# Patient Record
Sex: MALE | Race: WHITE | NOT HISPANIC OR LATINO | URBAN - NONMETROPOLITAN AREA
[De-identification: names, ages, dates, MRNs, and addresses within clinical notes are randomized per-mention and may not be internally consistent; named-entity substitution may affect disease eponyms.]

---

## 2019-01-08 ENCOUNTER — IMPORTED ENCOUNTER (OUTPATIENT)
Dept: URBAN - NONMETROPOLITAN AREA CLINIC 1 | Facility: CLINIC | Age: 69
End: 2019-01-08

## 2019-01-08 PROBLEM — H52.4: Noted: 2019-01-08

## 2019-01-08 PROBLEM — H52.223: Noted: 2019-01-08

## 2019-01-08 PROBLEM — H02.834: Noted: 2019-01-08

## 2019-01-08 PROBLEM — H02.831: Noted: 2019-01-08

## 2019-01-08 PROBLEM — E10.9: Noted: 2019-01-08

## 2019-01-08 PROBLEM — H52.03: Noted: 2019-01-08

## 2019-01-08 PROBLEM — H11.31: Noted: 2019-01-08

## 2019-01-08 PROCEDURE — 92012 INTRM OPH EXAM EST PATIENT: CPT

## 2019-01-08 NOTE — PATIENT DISCUSSION
ALEX/Work-In 1/8/19. Discussed [facility] and reassured. RTC for CEE. Type 1 DM per pt - 1999. 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

## 2019-02-27 ENCOUNTER — IMPORTED ENCOUNTER (OUTPATIENT)
Dept: URBAN - NONMETROPOLITAN AREA CLINIC 1 | Facility: CLINIC | Age: 69
End: 2019-02-27

## 2019-02-27 PROCEDURE — S0621 ROUTINE OPHTHALMOLOGICAL EXA: HCPCS

## 2019-02-27 NOTE — PATIENT DISCUSSION
Type 1 DM per pt - 1999. Last A1C was 6.9% in July 2018. Minimal BDR. Stressed continued good control. Letter. 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 PCP Pastora MCDOWELL @ [facility]

## 2019-09-23 NOTE — PATIENT DISCUSSION
New Prescription: prednisol ace-gatiflox-bromfen (prednisol ace-gatiflox-bromfen): drops,suspension: 1-0.5-0.075% 1 drop three times a day into affected eye 09-

## 2019-10-02 NOTE — PATIENT DISCUSSION
***This patient had traditional  cataract surgery performed. A monofocal  IOL was placed to achieve a target refraction of plano (which should provide them with satisfactory distance vision with the aid of glasses/contact lenses). ***

## 2019-10-02 NOTE — PATIENT DISCUSSION
S/P PC IOL, OD - DOING WELL. CONTINUE DROPS AS DIRECTED. FOLLOW. PT HAS FU SCHEDULED TOMORROW W/ DR. W[place].

## 2019-10-10 NOTE — PATIENT DISCUSSION
Stopped Today: prednisol ace-gatiflox-bromfen (prednisol ace-gatiflox-bromfen): drops,suspension: 1-0.5-0.075% 1 drop three times a day into affected eye 09-

## 2019-10-10 NOTE — PATIENT DISCUSSION
New Prescription: prednisol ace-gatiflox-bromfen (prednisol ace-gatiflox-bromfen): drops,suspension: 1-0.5-0.075% 1 drop three times a day into affected eye 10-

## 2019-10-16 NOTE — PATIENT DISCUSSION
Continue: prednisol ace-gatiflox-bromfen (prednisol ace-gatiflox-bromfen): drops,suspension: 1-0.5-0.075% 1 drop three times a day into affected eye 10-

## 2020-02-18 NOTE — PATIENT DISCUSSION
CATARACTS, OU - NOT VISUALLY SIGNIFICANT, PATIENT NOT VISUALLY BOTHERED. DISC OPTION OF [id number]. GLASSES RX GIVEN TO FILL IF DESIRES.

## 2020-02-18 NOTE — PATIENT DISCUSSION
specific patterns of ultraviolet (UV) light produced by the RxSight Light Delivery Device (LDD), an instrument that the doctor uses in the office beginning 2-3 weeks after cataract surgery. Up to three light adjustment treatments can be performed to improve the patients vision, with a separation of 3 days between treatments. When the patient and doctor are satisfied with the vision, the same LDD is used to lockin the RxLAL? and make the prescription permanent. From immediately after surgery until 24 hours after the completion of the lockin treatment, it was discussed that the need to protect the RxLAL from UV light in the environment by wearing protective eyewear during all waking hours.

## 2020-02-18 NOTE — PATIENT DISCUSSION
The RxSight Light Adjustable Lens (RxLAL) is similar to other intraocular lenses (IOLs) that can be implanted in the eye to replace the natural lens that is removed during cataract surgery. While all IOLs improve vision after cataract surgery, most patients will require glasses (or contact lenses) to improve their vision to the level required for driving or reading. The RxLAL reduces the need for glasses or contact lenses by being able to change its focusing power after it is implanted in the eye.  The focusing power of the RxLAL is adjusted by a

## 2020-02-18 NOTE — PATIENT DISCUSSION
The risks, benefits, and alternatives associated with the light adjustable lens have been discussed.

## 2020-02-18 NOTE — PATIENT DISCUSSION
COULD CONSIDER MONOVISION WITH BETSY IOL.  DISCUSSED BENEFIT DUE TO BEING ABLE TO ADJUST HIS VISION AFTER SURGERY

## 2020-10-26 NOTE — PATIENT DISCUSSION
CATARACTS, OU- SURGERY NOT RECOMMENDED AT THIS TIME. DISC OPT OF [id number]. FOLLOW UP WITH REFERRING PHYSICIAN.

## 2020-10-26 NOTE — PATIENT DISCUSSION
Continue: prednisol ace-gatiflox-bromfen (prednisol ace-gatiflox-bromfen): drops,suspension: 1-0.5-0.075% 1 drop three times a day as directed into affected eye 03-

## 2021-07-16 ENCOUNTER — IMPORTED ENCOUNTER (OUTPATIENT)
Dept: URBAN - NONMETROPOLITAN AREA CLINIC 1 | Facility: CLINIC | Age: 71
End: 2021-07-16

## 2021-07-16 PROBLEM — H34.12: Noted: 2021-07-16

## 2021-07-16 PROCEDURE — 92014 COMPRE OPH EXAM EST PT 1/>: CPT

## 2021-07-16 NOTE — PATIENT DISCUSSION
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 PCP Pastora MCDOWELL @ [facility]

## 2021-08-19 ENCOUNTER — IMPORTED ENCOUNTER (OUTPATIENT)
Dept: URBAN - NONMETROPOLITAN AREA CLINIC 1 | Facility: CLINIC | Age: 71
End: 2021-08-19

## 2021-08-19 PROCEDURE — 92014 COMPRE OPH EXAM EST PT 1/>: CPT

## 2021-08-19 NOTE — PATIENT DISCUSSION
[facility] ON FINDINGSMONIIT[facility] FOR NEOPT SAW PCP AND STARTED MEDSMONITOR FOR CHANGES; Dr's Notes:  responsible for taking care of all the area water plants. PCP Pastora MCDOWELL @ [facility]

## 2022-04-09 ASSESSMENT — VISUAL ACUITY
OD_CC: 20/30-1
OS_CC: 20/30
OU_CC: J1+
OS_CC: CF4'
OD_CC: 20/25
OS_CC: 20/40
OS_CC: CF4'
OD_CC: 20/25
OD_CC: 20/30

## 2022-04-09 ASSESSMENT — TONOMETRY
OD_IOP_MMHG: 18
OS_IOP_MMHG: 18
OS_IOP_MMHG: 18
OD_IOP_MMHG: 18
OD_IOP_MMHG: 18
OS_IOP_MMHG: 18

## 2022-08-22 ENCOUNTER — COMPREHENSIVE EXAM (OUTPATIENT)
Dept: RURAL CLINIC 1 | Facility: CLINIC | Age: 72
End: 2022-08-22

## 2022-08-22 DIAGNOSIS — H52.4: ICD-10-CM

## 2022-08-22 DIAGNOSIS — E11.9: ICD-10-CM

## 2022-08-22 DIAGNOSIS — H34.12: ICD-10-CM

## 2022-08-22 DIAGNOSIS — H25.13: ICD-10-CM

## 2022-08-22 PROCEDURE — 92014 COMPRE OPH EXAM EST PT 1/>: CPT

## 2022-08-22 PROCEDURE — 92015 DETERMINE REFRACTIVE STATE: CPT

## 2022-08-22 ASSESSMENT — VISUAL ACUITY: OD_SC: 20/20

## 2022-08-22 ASSESSMENT — TONOMETRY
OS_IOP_MMHG: 16
OD_IOP_MMHG: 18

## 2023-12-07 ENCOUNTER — COMPREHENSIVE EXAM (OUTPATIENT)
Dept: RURAL CLINIC 1 | Facility: CLINIC | Age: 73
End: 2023-12-07

## 2023-12-07 DIAGNOSIS — H34.12: ICD-10-CM

## 2023-12-07 DIAGNOSIS — H25.13: ICD-10-CM

## 2023-12-07 DIAGNOSIS — E11.9: ICD-10-CM

## 2023-12-07 PROCEDURE — 92014 COMPRE OPH EXAM EST PT 1/>: CPT

## 2023-12-07 ASSESSMENT — TONOMETRY
OD_IOP_MMHG: 16
OS_IOP_MMHG: 16

## 2023-12-07 ASSESSMENT — VISUAL ACUITY
OD_SC: 20/25
OU_CC: 20/25

## 2024-12-12 ENCOUNTER — FOLLOW UP (OUTPATIENT)
Age: 74
End: 2024-12-12

## 2024-12-12 DIAGNOSIS — H34.12: ICD-10-CM

## 2024-12-12 DIAGNOSIS — E11.9: ICD-10-CM

## 2024-12-12 DIAGNOSIS — H25.13: ICD-10-CM

## 2024-12-12 PROCEDURE — 92014 COMPRE OPH EXAM EST PT 1/>: CPT
